# Patient Record
Sex: MALE | Race: WHITE | URBAN - NONMETROPOLITAN AREA
[De-identification: names, ages, dates, MRNs, and addresses within clinical notes are randomized per-mention and may not be internally consistent; named-entity substitution may affect disease eponyms.]

---

## 2022-08-31 ENCOUNTER — OFFICE VISIT (OUTPATIENT)
Dept: URBAN - NONMETROPOLITAN AREA CLINIC 10 | Facility: CLINIC | Age: 62
End: 2022-08-31
Payer: COMMERCIAL

## 2022-08-31 DIAGNOSIS — H25.13 AGE-RELATED NUCLEAR CATARACT, BILATERAL: ICD-10-CM

## 2022-08-31 DIAGNOSIS — E11.3313 DIABETES MELLITUS TYPE 2 WITH MODERATE NON-PROLIFERATIVE RETINOPATHY WITH MACULAR EDEMA, BILATERAL: Primary | ICD-10-CM

## 2022-08-31 PROCEDURE — 99204 OFFICE O/P NEW MOD 45 MIN: CPT | Performed by: STUDENT IN AN ORGANIZED HEALTH CARE EDUCATION/TRAINING PROGRAM

## 2022-08-31 PROCEDURE — 92134 CPTRZ OPH DX IMG PST SGM RTA: CPT | Performed by: STUDENT IN AN ORGANIZED HEALTH CARE EDUCATION/TRAINING PROGRAM

## 2022-08-31 ASSESSMENT — KERATOMETRY
OS: 43.13
OD: 43.25

## 2022-08-31 ASSESSMENT — INTRAOCULAR PRESSURE
OD: 15
OS: 14

## 2022-08-31 NOTE — IMPRESSION/PLAN
Impression: Diabetes mellitus Type 2 with moderate non-proliferative retinopathy with macular edema, bilateral: G29.2630. Plan: Moderate Background Diabetic Retinopathy, no signs of neovascularization noted. Counseled patient on the ocular and systemic benefits of maintaining blood sugar control. Note to be sent to patient's PCP detailing findings. Recommend consult with Dr. Mercedes Barrientos for macular edema.

## 2022-10-12 ENCOUNTER — OFFICE VISIT (OUTPATIENT)
Dept: URBAN - NONMETROPOLITAN AREA CLINIC 6 | Facility: CLINIC | Age: 62
End: 2022-10-12
Payer: COMMERCIAL

## 2022-10-12 DIAGNOSIS — E11.3313 TYPE 2 DIABETES MELLITUS WITH MODERATE NONPROLIFERATIVE DIABETIC RETINOPATHY WITH MACULAR EDEMA, BILATERAL: Primary | ICD-10-CM

## 2022-10-12 DIAGNOSIS — Z79.84 LONG TERM (CURRENT) USE OF ORAL ANTIDIABETIC DRUGS: ICD-10-CM

## 2022-10-12 PROCEDURE — 92004 COMPRE OPH EXAM NEW PT 1/>: CPT | Performed by: OPHTHALMOLOGY

## 2022-10-12 PROCEDURE — 92235 FLUORESCEIN ANGRPH MLTIFRAME: CPT | Performed by: OPHTHALMOLOGY

## 2022-10-12 PROCEDURE — 92134 CPTRZ OPH DX IMG PST SGM RTA: CPT | Performed by: OPHTHALMOLOGY

## 2022-10-12 ASSESSMENT — INTRAOCULAR PRESSURE
OS: 15
OD: 17

## 2022-10-12 NOTE — IMPRESSION/PLAN
Impression: Long term (current) use of oral antidiabetic drugs: Z79.84. Plan: A1C 6.4 Stressed the importance compliance, blood glucose, and blood pressure control in preventing progression of retinopathy and/or maculopathy and potentially irreversible vision loss and blindness